# Patient Record
Sex: MALE | Race: WHITE | Employment: FULL TIME | URBAN - METROPOLITAN AREA
[De-identification: names, ages, dates, MRNs, and addresses within clinical notes are randomized per-mention and may not be internally consistent; named-entity substitution may affect disease eponyms.]

---

## 2023-03-31 ENCOUNTER — OFFICE VISIT (OUTPATIENT)
Dept: INTERNAL MEDICINE CLINIC | Facility: CLINIC | Age: 33
End: 2023-03-31
Payer: COMMERCIAL

## 2023-03-31 VITALS
DIASTOLIC BLOOD PRESSURE: 82 MMHG | SYSTOLIC BLOOD PRESSURE: 120 MMHG | HEIGHT: 74 IN | BODY MASS INDEX: 27.21 KG/M2 | RESPIRATION RATE: 17 BRPM | WEIGHT: 212 LBS | OXYGEN SATURATION: 97 % | HEART RATE: 70 BPM

## 2023-03-31 DIAGNOSIS — Z00.00 HEALTH MAINTENANCE EXAMINATION: Primary | ICD-10-CM

## 2023-03-31 DIAGNOSIS — F32.5 MAJOR DEPRESSIVE DISORDER WITH SINGLE EPISODE, IN FULL REMISSION (HCC): ICD-10-CM

## 2023-03-31 DIAGNOSIS — L70.0 ACNE VULGARIS: ICD-10-CM

## 2023-03-31 DIAGNOSIS — F41.1 GENERALIZED ANXIETY DISORDER: ICD-10-CM

## 2023-03-31 DIAGNOSIS — E66.3 OVERWEIGHT (BMI 25.0-29.9): ICD-10-CM

## 2023-03-31 LAB
ALBUMIN SERPL-MCNC: 4.5 G/DL (ref 3.4–5)
ALBUMIN/GLOB SERPL: 1.5 {RATIO} (ref 1–2)
ALP LIVER SERPL-CCNC: 59 U/L
ALT SERPL-CCNC: 45 U/L
ANION GAP SERPL CALC-SCNC: 6 MMOL/L (ref 0–18)
AST SERPL-CCNC: 30 U/L (ref 15–37)
BILIRUB SERPL-MCNC: 0.8 MG/DL (ref 0.1–2)
BUN BLD-MCNC: 14 MG/DL (ref 7–18)
BUN/CREAT SERPL: 10.3 (ref 10–20)
CALCIUM BLD-MCNC: 9.3 MG/DL (ref 8.5–10.1)
CHLORIDE SERPL-SCNC: 106 MMOL/L (ref 98–112)
CHOLEST SERPL-MCNC: 169 MG/DL (ref ?–200)
CO2 SERPL-SCNC: 27 MMOL/L (ref 21–32)
CREAT BLD-MCNC: 1.36 MG/DL
EST. AVERAGE GLUCOSE BLD GHB EST-MCNC: 103 MG/DL (ref 68–126)
FASTING PATIENT LIPID ANSWER: NO
FASTING STATUS PATIENT QL REPORTED: NO
GFR SERPLBLD BASED ON 1.73 SQ M-ARVRAT: 72 ML/MIN/1.73M2 (ref 60–?)
GLOBULIN PLAS-MCNC: 3.1 G/DL (ref 2.8–4.4)
GLUCOSE BLD-MCNC: 86 MG/DL (ref 70–99)
HBA1C MFR BLD: 5.2 % (ref ?–5.7)
HDLC SERPL-MCNC: 43 MG/DL (ref 40–59)
LDLC SERPL CALC-MCNC: 110 MG/DL (ref ?–100)
NONHDLC SERPL-MCNC: 126 MG/DL (ref ?–130)
OSMOLALITY SERPL CALC.SUM OF ELEC: 288 MOSM/KG (ref 275–295)
POTASSIUM SERPL-SCNC: 4.6 MMOL/L (ref 3.5–5.1)
PROT SERPL-MCNC: 7.6 G/DL (ref 6.4–8.2)
SODIUM SERPL-SCNC: 139 MMOL/L (ref 136–145)
TRIGL SERPL-MCNC: 87 MG/DL (ref 30–149)
TSI SER-ACNC: 0.76 MIU/ML (ref 0.36–3.74)
VLDLC SERPL CALC-MCNC: 15 MG/DL (ref 0–30)

## 2023-03-31 PROCEDURE — 83036 HEMOGLOBIN GLYCOSYLATED A1C: CPT | Performed by: FAMILY MEDICINE

## 2023-03-31 PROCEDURE — 84443 ASSAY THYROID STIM HORMONE: CPT | Performed by: FAMILY MEDICINE

## 2023-03-31 PROCEDURE — 80061 LIPID PANEL: CPT | Performed by: FAMILY MEDICINE

## 2023-03-31 PROCEDURE — 99385 PREV VISIT NEW AGE 18-39: CPT | Performed by: FAMILY MEDICINE

## 2023-03-31 PROCEDURE — 87389 HIV-1 AG W/HIV-1&-2 AB AG IA: CPT | Performed by: FAMILY MEDICINE

## 2023-03-31 PROCEDURE — 3074F SYST BP LT 130 MM HG: CPT | Performed by: FAMILY MEDICINE

## 2023-03-31 PROCEDURE — 80053 COMPREHEN METABOLIC PANEL: CPT | Performed by: FAMILY MEDICINE

## 2023-03-31 PROCEDURE — 3079F DIAST BP 80-89 MM HG: CPT | Performed by: FAMILY MEDICINE

## 2023-03-31 PROCEDURE — 3008F BODY MASS INDEX DOCD: CPT | Performed by: FAMILY MEDICINE

## 2023-03-31 RX ORDER — ESCITALOPRAM OXALATE 10 MG/1
TABLET ORAL
COMMUNITY
Start: 2023-03-13 | End: 2023-03-31

## 2023-03-31 RX ORDER — ESCITALOPRAM OXALATE 10 MG/1
10 TABLET ORAL DAILY
Qty: 90 TABLET | Refills: 3 | Status: SHIPPED | OUTPATIENT
Start: 2023-03-31

## 2023-03-31 NOTE — ASSESSMENT & PLAN NOTE
Patient with a few scattered acne pustules seen on the forehead, he reports that some of them have been present for several months and have not gone away at all. Advised trial of adapalene and benzyl peroxide. I will refer to dermatology to further evaluate as well. Does have some redness along the cheeks, possibly early signs of rosacea as well.

## 2023-03-31 NOTE — ASSESSMENT & PLAN NOTE
Patient with a history of generalized anxiety disorder and well-controlled major depressive disorder. Overall his anxiety and depression are controlled. Has been seeing a therapist  Doing well on escitalopram 10 mg daily-continue.

## 2023-03-31 NOTE — ASSESSMENT & PLAN NOTE
Healthy diet and exercise advised  Can check labs  Suspect the weight gain is partially related to Lexapro-we will continue for now as he is doing well from a mental health standpoint. We will monitor weights carefully.

## 2023-03-31 NOTE — PATIENT INSTRUCTIONS
PATIENT INSTRUCTIONS    Thank you for seeing me today, it was a pleasure taking care of you. Please check out at the  and schedule a follow up appointment. Return in about 1 year (around 3/31/2024) for physical.  Please get your labs drawn - you may need to schedule a lab appointment if this was not completed at your recent doctor's visit. The following imaging studies were ordered: None  Please also follow up with the following specialists: Dermatology  Please fill out the advance directive information (power of  documents) and bring a copy to our clinic.   Continue your escitalopram   Can try over the counter adapalene at night and benzoyl peroxide during the day   Continue to work on healthy diet and exercise  Consider COVID booster      [place]ryan,   Dr. Payal Hunter

## 2023-03-31 NOTE — ASSESSMENT & PLAN NOTE
Exercise and diet advised. CMP, lipid panel, Hba1c, TSH, HIV screen,  Advanced directive information provided. Advised COVID vaccine booster.

## 2023-06-27 ENCOUNTER — OFFICE VISIT (OUTPATIENT)
Dept: DERMATOLOGY CLINIC | Facility: CLINIC | Age: 33
End: 2023-06-27

## 2023-06-27 DIAGNOSIS — D22.9 BENIGN MOLE: Primary | ICD-10-CM

## 2023-06-27 DIAGNOSIS — L81.4 LENTIGINES: ICD-10-CM

## 2023-06-27 DIAGNOSIS — B07.9 VERRUCA VULGARIS: ICD-10-CM

## 2023-06-27 PROCEDURE — 17110 DESTRUCTION B9 LES UP TO 14: CPT | Performed by: STUDENT IN AN ORGANIZED HEALTH CARE EDUCATION/TRAINING PROGRAM

## 2023-06-27 PROCEDURE — 99203 OFFICE O/P NEW LOW 30 MIN: CPT | Performed by: STUDENT IN AN ORGANIZED HEALTH CARE EDUCATION/TRAINING PROGRAM
